# Patient Record
Sex: FEMALE | Race: WHITE | NOT HISPANIC OR LATINO | URBAN - METROPOLITAN AREA
[De-identification: names, ages, dates, MRNs, and addresses within clinical notes are randomized per-mention and may not be internally consistent; named-entity substitution may affect disease eponyms.]

---

## 2017-08-03 ENCOUNTER — AMBULATORY - HEALTHEAST (OUTPATIENT)
Dept: ADMINISTRATIVE | Facility: REHABILITATION | Age: 57
End: 2017-08-03

## 2017-08-03 DIAGNOSIS — M54.9 SPINAL PAIN: ICD-10-CM

## 2017-08-03 DIAGNOSIS — M79.18 MYOFASCIAL PAIN: ICD-10-CM

## 2017-08-03 DIAGNOSIS — R51.9 HEADACHE: ICD-10-CM

## 2017-08-03 DIAGNOSIS — S03.00XA TMJ (DISLOCATION OF TEMPOROMANDIBULAR JOINT): ICD-10-CM

## 2017-09-06 ENCOUNTER — OFFICE VISIT - HEALTHEAST (OUTPATIENT)
Dept: PHYSICAL THERAPY | Facility: REHABILITATION | Age: 57
End: 2017-09-06

## 2017-09-06 ENCOUNTER — COMMUNICATION - HEALTHEAST (OUTPATIENT)
Dept: TELEHEALTH | Facility: CLINIC | Age: 57
End: 2017-09-06

## 2017-09-06 DIAGNOSIS — M54.2 NECK PAIN, CHRONIC: ICD-10-CM

## 2017-09-06 DIAGNOSIS — M26.622 ARTHRALGIA OF LEFT TEMPOROMANDIBULAR JOINT: ICD-10-CM

## 2017-09-06 DIAGNOSIS — G89.29 NECK PAIN, CHRONIC: ICD-10-CM

## 2017-09-13 ENCOUNTER — OFFICE VISIT - HEALTHEAST (OUTPATIENT)
Dept: PHYSICAL THERAPY | Facility: REHABILITATION | Age: 57
End: 2017-09-13

## 2017-09-13 DIAGNOSIS — G89.29 NECK PAIN, CHRONIC: ICD-10-CM

## 2017-09-13 DIAGNOSIS — M26.622 ARTHRALGIA OF LEFT TEMPOROMANDIBULAR JOINT: ICD-10-CM

## 2017-09-13 DIAGNOSIS — M54.2 NECK PAIN, CHRONIC: ICD-10-CM

## 2017-09-20 ENCOUNTER — OFFICE VISIT - HEALTHEAST (OUTPATIENT)
Dept: PHYSICAL THERAPY | Facility: REHABILITATION | Age: 57
End: 2017-09-20

## 2017-09-20 DIAGNOSIS — M26.622 ARTHRALGIA OF LEFT TEMPOROMANDIBULAR JOINT: ICD-10-CM

## 2017-09-20 DIAGNOSIS — G89.29 NECK PAIN, CHRONIC: ICD-10-CM

## 2017-09-20 DIAGNOSIS — M54.2 NECK PAIN, CHRONIC: ICD-10-CM

## 2017-09-25 ENCOUNTER — OFFICE VISIT - HEALTHEAST (OUTPATIENT)
Dept: PHYSICAL THERAPY | Facility: REHABILITATION | Age: 57
End: 2017-09-25

## 2017-09-25 DIAGNOSIS — M26.622 ARTHRALGIA OF LEFT TEMPOROMANDIBULAR JOINT: ICD-10-CM

## 2017-09-25 DIAGNOSIS — M54.2 NECK PAIN, CHRONIC: ICD-10-CM

## 2017-09-25 DIAGNOSIS — G89.29 NECK PAIN, CHRONIC: ICD-10-CM

## 2017-10-02 ENCOUNTER — OFFICE VISIT - HEALTHEAST (OUTPATIENT)
Dept: PHYSICAL THERAPY | Facility: REHABILITATION | Age: 57
End: 2017-10-02

## 2017-10-02 DIAGNOSIS — M54.2 NECK PAIN, CHRONIC: ICD-10-CM

## 2017-10-02 DIAGNOSIS — G89.29 NECK PAIN, CHRONIC: ICD-10-CM

## 2017-10-02 DIAGNOSIS — M26.622 ARTHRALGIA OF LEFT TEMPOROMANDIBULAR JOINT: ICD-10-CM

## 2017-10-09 ENCOUNTER — OFFICE VISIT - HEALTHEAST (OUTPATIENT)
Dept: PHYSICAL THERAPY | Facility: REHABILITATION | Age: 57
End: 2017-10-09

## 2017-10-09 DIAGNOSIS — G89.29 NECK PAIN, CHRONIC: ICD-10-CM

## 2017-10-09 DIAGNOSIS — M26.622 ARTHRALGIA OF LEFT TEMPOROMANDIBULAR JOINT: ICD-10-CM

## 2017-10-09 DIAGNOSIS — M54.2 NECK PAIN, CHRONIC: ICD-10-CM

## 2017-10-16 ENCOUNTER — OFFICE VISIT - HEALTHEAST (OUTPATIENT)
Dept: PHYSICAL THERAPY | Facility: REHABILITATION | Age: 57
End: 2017-10-16

## 2017-10-16 DIAGNOSIS — R51.9 HEAD ACHE: ICD-10-CM

## 2017-10-16 DIAGNOSIS — M54.2 NECK PAIN, CHRONIC: ICD-10-CM

## 2017-10-16 DIAGNOSIS — G89.29 NECK PAIN, CHRONIC: ICD-10-CM

## 2017-10-16 DIAGNOSIS — M26.622 ARTHRALGIA OF LEFT TEMPOROMANDIBULAR JOINT: ICD-10-CM

## 2017-10-16 DIAGNOSIS — G44.221 CHRONIC TENSION-TYPE HEADACHE, INTRACTABLE: ICD-10-CM

## 2017-10-23 ENCOUNTER — OFFICE VISIT - HEALTHEAST (OUTPATIENT)
Dept: PHYSICAL THERAPY | Facility: REHABILITATION | Age: 57
End: 2017-10-23

## 2017-10-23 DIAGNOSIS — G44.221 CHRONIC TENSION-TYPE HEADACHE, INTRACTABLE: ICD-10-CM

## 2017-10-23 DIAGNOSIS — G89.29 NECK PAIN, CHRONIC: ICD-10-CM

## 2017-10-23 DIAGNOSIS — M54.2 NECK PAIN, CHRONIC: ICD-10-CM

## 2017-10-23 DIAGNOSIS — M26.622 ARTHRALGIA OF LEFT TEMPOROMANDIBULAR JOINT: ICD-10-CM

## 2021-05-31 ENCOUNTER — RECORDS - HEALTHEAST (OUTPATIENT)
Dept: ADMINISTRATIVE | Facility: CLINIC | Age: 61
End: 2021-05-31

## 2021-06-12 NOTE — PROGRESS NOTES
[facility] Daily Progress     Patient Name: Antonietta Womack  Date: 2017  Visit #: 2  PTA visit #:   Referral Diagnosis: headsche, myofascial pain, TMJ pain  Referring provider: Skip Youngblood MD  Visit Diagnosis:     ICD-10-CM    1. Arthralgia of left temporomandibular joint M26.622    2. Neck pain, chronic M54.2     G89.29          Assessment:     HEP/POC compliance is  good .  Patient demonstrates understanding/independence with home program.  Patient is benefitting from skilled physical therapy and is making steady progress toward functional goals.  Patient is appropriate to continue with skilled physical therapy intervention, as indicated by initial plan of care.    Goal Status:  Pt. will demonstrate/verbalize independence in self-management of condition in : 4 weeks  Pt will: be able to sleep on her left side without increased pain in 4 weeks.  Pt will: be able to eat chewy foods without increased pain in 4 weeks.      Continued ringing in the ear but less mucus production.    Plan / Patient Education:     Continue with initial plan of care.  Progress with home program as tolerated.   Patient to continue with a craniosacral therapist.    Subjective:     Pain Ratin  Started jogging and walking this week.  Ringing in the ear is still present.    Felt less congested after the last visit. Feels she had less mucus production this past week.      Objective:     Exercises:  Exercise #1: scapular retraction 5 second hold 5 times every hour  Exercise #2: diaphragmatic breathing. 5 times while keeping tongue on the roof of the mouth.  Exercise #3: laterortusion stabilization 5 second hold 5 times 2 times a day both directions.      Treatment Today     TREATMENT MINUTES COMMENTS   Evaluation     Self-care/ Home management     Manual therapy 25 Lymphatic drainage massage to the cervical spine, suboccipital release, myofascial release to the left TMJ   Neuromuscular Re-education     Therapeutic Activity      Therapeutic Exercises 5 Reviewed home exercise program   Gait training     Modality__________________                Total 30    Blank areas are intentional and mean the treatment did not include these items.       Isabel Toribio, PT  9/13/2017

## 2021-06-12 NOTE — PROGRESS NOTES
[facility]   TMJ Initial Evaluation    Patient Name: Antonietta Womack  Date of evaluation: 9/6/2017  Referral Diagnosis: Spinal pain, HA myofascial TMJ pain  Referring provider: Skip Youngblood MD  Visit Diagnosis:     ICD-10-CM    1. Arthralgia of left temporomandibular joint M26.622    2. Neck pain, chronic M54.2     G89.29        Assessment:         Patient with chronic left TMJ and bilateral neck pain.  Tends to clench her teeth at night.  Has a night splint for this.  Presents with normal TMJ mobility.  Would benefit from cranialsacral therapy to address eustachian tube issues and ringing in the ear.     Pt. is appropriate for skilled PT intervention as outlined in the Plan of Care (POC).  Pt. is a good candidate for skilled PT services to improve pain levels and function.    Goals:  Pt. will demonstrate/verbalize independence in self-management of condition in : 4 weeks  Pt will: be able to sleep on her left side without increased pain in 4 weeks.  Pt will: be able to eat chewy foods without increased pain in 4 weeks.     Patient's expectations/goals are realistic.    Barriers to Learning or Achieving Goals:  No Barriers         Plan / Patient Instructions:        Plan of Care:   Communication with: Referral Source  Patient Related Instruction: Nature of Condition;Treatment plan and rationale;Body mechanics;Posture;Precautions;Next steps;Expected outcome;Self Care instruction;Basis of treatment  Times per Week: 1  Number of Weeks: 6-8  Number of Visits: 6-8  Discharge Planning: to include self management  Therapeutic Exercise: ROM;Stretching;Strengthening  Manual Therapy: soft tissue mobilization;myofascial release;joint mobilization;muscle energy;strain counterstrain    Plan for next visit:   Continue with MLD for lymphatic drainage of the neck, postural exercises.     Subjective:         History of Present Illness:    Antonietta is a 57 y.o. female who presents to therapy today with complaints of left TMJ  pain .  Reports that her jaw clicks and that she has pain with talking and excessive chewing.  Is unable to sleep on her left side.  Has had TMJ issues for many years.  Has a night splint and has had PT before which was helpful.   Date of onset/duration of symptoms is 3/2017.Has had a lot of stress since March.  Was having a lot of trouble sleeping because of post nasal drip.  Eventually developed a plugged ear and ringing in her ear.  Saw an ENT and she felt that her exposure to dust was the reason for the ear issues.   Tried musinex D, a saline spray which helped a lot.  Continues to have mucus issues, TMJ and eustachian tube issues.   She describes their previous level of function as not limited        Pain Rating:3    Functional limitations are described as occurring with:   chewing, talking and sleeping on her left side         Objective:      Note: Items left blank indicates the item was not performed or not indicated at the time of the evaluation.    TMJ Examination  1. Arthralgia of left temporomandibular joint     2. Neck pain, chronic       Precautions/Restrictions: None    Involved joint: Left  Parafunctional activities: Clenching  Head/Jaw Observation:Breathing pattern  Upper Chest Breathing    Posture Observation:      General sitting posture is  poor.  General standing posture is poor.  Cervical:  Mild forward head      Cervical ROM:    Date: 9/6/17     *Indicate scale AROM AROM AROM   Cervical Flexion Min limited with central neck pain     Cervical Extension Min limited with central neck pain      Right Left Right Left Right Left   Cervical Sidebending         Cervical Rotation Min limited with right scapular pain Moderate limited        Cervical Protraction      Cervical Retraction Min limited.     Thoracic Flexion      Thoracic Extension      Thoracic Sidebending         Thoracic Rotation             TMJ ROM:    Motion(normal values) mm Pain Comment (noise/ deviation)   Active Incisal opening (40-60  mm) 45     Right laterotrusion (7-12mm) 12 mm clicking on the left     Left laterotrusion(7-12 mm) 12mm     Protrusion (8-12mm) 7 mm        No deviation with jaw opening.    5 mm of overbite    Positive cervical distraction test.    Palpation tenderness in the following areas: Temporalis  bilateral  Upper trapezius  bilateral  Scalenes  bilateral  Suboccipitals  left  Special Tests:  Translation: Normal    Treatment Today    TREATMENT MINUTES COMMENTS   Evaluation 30    Self-care/ Home management     Manual therapy 15 MLD to cervical spine and suprascapular fossa.  Cervical manual traction.   Neuromuscular Re-education     Therapeutic Activity     Therapeutic Exercises 15 Exercises per flow sheet   Gait training     Modality__________________                Total 60    Blank areas are intentional and mean the treatment did not include these items.   PT Evaluation Code: (Please list factors)  Patient History/Comorbidities: none  Examination: tmj pain and limited cervical mobility  Clinical Presentation: stable  Clinical Decision Making: low    Patient History/  Comorbidities Examination  (body structures and functions, activity limitations, and/or participation restrictions) Clinical Presentation Clinical Decision Making (Complexity)   No documented Comorbidities or personal factors 1-2 Elements Stable and/or uncomplicated Low   1-2 documented comorbidities or personal factor 3 Elements Evolving clinical presentation with changing characteristics Moderate   3-4 documented comorbidities or personal factors 4 or more Unstable and unpredictable High                  Isabel Toribio, PT  9/6/2017  2:50 PM

## 2021-06-13 NOTE — PROGRESS NOTES
[facility] Daily Progress     Patient Name: Antonietta Womack  Date: 10/2/2017  Visit #: 5/12    Referral Diagnosis: TMJ, Neck Pain HA's  Referring provider: Karo Eugene MD  Visit Diagnosis:     ICD-10-CM    1. Arthralgia of left temporomandibular joint M26.622    2. Neck pain, chronic M54.2     G89.29          Assessment:     Patient demonstrates understanding/independence with home program.    Goal Status:  Pt. will demonstrate/verbalize independence in self-management of condition in : 4 weeks  Pt will: be able to sleep on her left side without increased pain in 4 weeks.  Pt will: be able to eat chewy foods without increased pain in 4 weeks.     Plan / Patient Education:     Continue with initial plan of care.    Subjective:     Pain Rating: 3  Pain to L TMJ and Face      Objective:     + Scan  Cranial Arterials    Treatment Today     TREATMENT MINUTES COMMENTS   Evaluation     Self-care/ Home management     Manual therapy 30 MFR with OA relase,  Crnanial releases TMJ's  Temporal, Frontal, Nasal bones  SCS to MAX-A,   CIRC-A R,       Releases achieved,   Lymphatic flow to the R Face and head normalized  Revied with Pt TMJ and Temporal bone releases   Neuromuscular Re-education     Therapeutic Activity     Therapeutic Exercises     Gait training     Modality__________________                Total 30    Blank areas are intentional and mean the treatment did not include these items.       Arthur Townsend  10/2/2017

## 2021-06-13 NOTE — PROGRESS NOTES
[facility] Daily Progress     Patient Name: Antonietta Womack  Date: 2017  Visit #:3/12    Referral Diagnosis: TMJ,  Neck Pain  Referring provider: Karo Eugene MD  Visit Diagnosis:     ICD-10-CM    1. Arthralgia of left temporomandibular joint M26.622    2. Neck pain, chronic M54.2     G89.29          Assessment:     Patient is benefitting from skilled physical therapy and is making steady progress toward functional goals.  Patient is appropriate to continue with skilled physical therapy intervention, as indicated by initial plan of care.    Goal Status:  Pt. will demonstrate/verbalize independence in self-management of condition in : 4 weeks  Pt will: be able to sleep on her left side without increased pain in 4 weeks.  Pt will: be able to eat chewy foods without increased pain in 4 weeks.     Plan / Patient Education:     Continue with initial plan of care.    Subjective:     Pain Ratin-7/10    Sleeping on my sides will increase the pain      Objective:     Decreased lymphatic flow to the neck and head,   Clavicle R 16 sec,  L 12 sec,   Neck L 10  Sec,  R 12 sec    Treatment Today     TREATMENT MINUTES COMMENTS   Evaluation     Self-care/ Home management     Manual therapy 30 MFR with OA release.   Cranial releases tp Frontal, nasal,  Temporal bones with instruction to the pt in the temporal bone release    Releases achieved.  Lymphatic flow to the head and neck normalized.  Pt yo observe for any changes in her TMJ symptoms   Neuromuscular Re-education     Therapeutic Activity     Therapeutic Exercises     Gait training     Modality__________________                Total 30    Blank areas are intentional and mean the treatment did not include these items.       Arthur Townsend  2017

## 2021-06-13 NOTE — PROGRESS NOTES
[facility] Daily Progress     [facility] Discharge Summary  Patient Name: Antonietta Womack  Date: 1/22/2018  Referral Diagnosis: [unfilled]  Referring provider: Karo Eugene MD  Visit Diagnosis:   1. Neck pain, chronic     2. Arthralgia of left temporomandibular joint     3. Chronic tension-type headache, intractable         Goals:    Pt. will demonstrate/verbalize independence in self-management of condition in : 4 weeks  Improved with Reduction of TMJ pain to the 0-2/10 level  Pt will: be able to sleep on her left side without increased pain in 4 weeks.    Improved with the reduction of TMJ pain  Pt will: be able to eat chewy foods without increased pain in 4 weeks. Improved with the reduction of TMJ pain  Pt. will demonstrate/verbalize independence in self-management of condition in : Met      Patient was seen for 8 visits from 9-6-17 to 10-23-17 with 0 missed appointments.  The patient attended therapy initially, but did not finish the therapy sessions prescribed.  Goals were not fully achieved. Explanation for goals not achieved: Pt experienced a decrease in jaw pain to 2/10 but, did not return for continuation of therapy.   Will D/C PT    Therapy will be discontinued at this time.  The patient will need a new referral to resume.    Thank you for your referral.  Arthur ARMSTRONG Cary  1/22/2018  11:17 AM  Patient Name: Antonietta Womack  Date: 1/22/2018  Visit #: 8/12    Referral Diagnosis: TMJ pain HA's  Referring provider: Karo Eugene MD  Visit Diagnosis:     ICD-10-CM    1. Neck pain, chronic M54.2     G89.29    2. Arthralgia of left temporomandibular joint M26.622    3. Chronic tension-type headache, intractable G44.221          Assessment:     Patient demonstrates understanding/independence with home program.  Patient is benefitting from skilled physical therapy and is making steady progress toward functional goals.  Patient is appropriate to continue with skilled physical  therapy intervention, as indicated by initial plan of care.    Goal Status:  Pt. will demonstrate/verbalize independence in self-management of condition in : Met  No Data Recorded    Plan / Patient Education:     Continue with initial plan of care.    Subjective:     Pain Ratin  Pain is isolate to the L Upper Jaw muscle      Objective:     Tender points to the facial muscles,  Soft  tissue    Treatment Today     TREATMENT MINUTES COMMENTS   Evaluation     Self-care/ Home management     Manual therapy 30 SCS to L Suprahyoid,   Medial Ptergyoid Bilat.   L Temporalis,   SCS to TRIG1,2-N,   FAC2,3-N    Tender points resolved.  Pt to observe facial pain with  Sleeping on the L side   Neuromuscular Re-education     Therapeutic Activity     Therapeutic Exercises     Gait training     Modality__________________                Total 30    Blank areas are intentional and mean the treatment did not include these items.       Arthur Townsend  2018

## 2021-06-13 NOTE — PROGRESS NOTES
[facility] Daily Progress     Patient Name: Antonietta Womack  Date: 2017  Visit #:    Referral Diagnosis: TMJ pain Ringing in the ears  Referring provider: Karo Eugene MD  Visit Diagnosis:     ICD-10-CM    1. Arthralgia of left temporomandibular joint M26.622    2. Neck pain, chronic M54.2     G89.29          Assessment:     Patient demonstrates understanding/independence with home program.  Patient is benefitting from skilled physical therapy and is making steady progress toward functional goals.  Patient is appropriate to continue with skilled physical therapy intervention, as indicated by initial plan of care.    Goal Status:  Pt. will demonstrate/verbalize independence in self-management of condition in : 4 weeks  Pt will: be able to sleep on her left side without increased pain in 4 weeks.  Pt will: be able to eat chewy foods without increased pain in 4 weeks.     Plan / Patient Education:     Continue with initial plan of care.    Subjective:     Pain Ratin-4/10  Pain varies  Worse with pressure on the Left   Ringing sound changes to a higher pitch.         Objective:     Temporal bone restriction.   4 sec.   Lympahtic flow to the Neck and head 4 sec    Treatment Today     TREATMENT MINUTES COMMENTS   Evaluation     Self-care/ Home management     Manual therapy 30 MFR with OA release,  Temporal and TMJ release,  SCS to TENT-N,   SUTHERLAND-N,  SB-N,   TMJ and Temporal bone releases taught to the pt    Releases achieved.  Pt Ind in the 2 releases taught to her.  No immediate change in sound or jaw pain  Pt to observe   Neuromuscular Re-education     Therapeutic Activity     Therapeutic Exercises     Gait training     Modality__________________                Total 30    Blank areas are intentional and mean the treatment did not include these items.       Arthur Townsend  2017

## 2021-06-13 NOTE — PROGRESS NOTES
[facility] Daily Progress     Patient Name: Antonietta Womack  Date: 10/9/2017  Visit #:     Referral Diagnosis: Neck and TMJ Pain  Referring provider: Karo Eugene MD  Visit Diagnosis:     ICD-10-CM    1. Arthralgia of left temporomandibular joint M26.622    2. Neck pain, chronic M54.2     G89.29          Assessment:     Patient demonstrates understanding/independence with home program.  Patient is benefitting from skilled physical therapy and is making steady progress toward functional goals.  Patient is appropriate to continue with skilled physical therapy intervention, as indicated by initial plan of care.    Goal Status:  Pt. will demonstrate/verbalize independence in self-management of condition in : 4 weeks  Pt will: be able to sleep on her left side without increased pain in 4 weeks.  Pt will: be able to eat chewy foods without increased pain in 4 weeks.     Plan / Patient Education:     Continue with initial plan of care.    Subjective:     Pain Ratin  pain to the HA and Neck      Objective:     + scan to Lig Flavum    Treatment Today     TREATMENT MINUTES COMMENTS   Evaluation     Self-care/ Home management     Manual therapy 30 SCS to LFC2, 3-MS.   SCS to DCFA-MS,   DFCP-MS   MFR with OA release    Tender points reduced.  HA decreased   Focus on Ant Long Lig next session   Neuromuscular Re-education     Therapeutic Activity     Therapeutic Exercises     Gait training     Modality__________________                Total 30    Blank areas are intentional and mean the treatment did not include these items.       Arthur Townsend  10/9/2017

## 2021-06-13 NOTE — PROGRESS NOTES
[facility] Daily Progress     Patient Name: Antonietta Wmoack  Date: 10/16/2017  Visit #: 7/10    Referral Diagnosis: HA  TMJ pain  Referring provider: Karo Eugene MD  Visit Diagnosis:     ICD-10-CM    1. Neck pain, chronic M54.2     G89.29          Assessment:     Patient demonstrates understanding/independence with home program.  Patient is benefitting from skilled physical therapy and is making steady progress toward functional goals.  Patient is appropriate to continue with skilled physical therapy intervention, as indicated by initial plan of care.    Goal Status:  Pt. will demonstrate/verbalize independence in self-management of condition in : 4 weeks  Pt will: be able to sleep on her left side without increased pain in 4 weeks.  Pt will: be able to eat chewy foods without increased pain in 4 weeks.     Plan / Patient Education:     Continue with initial plan of care.    Subjective:     Pain Ratin  Pain to L TMJ and HA started Last Night      Objective:     R SI Downslip   + Scan  CN 5,7,10    Ant Spinal art R,   Cranial restrictions at Frontal, Nasal    Treatment Today     TREATMENT MINUTES COMMENTS   Evaluation     Self-care/ Home management     Manual therapy 60 SCS to L Digastric muscle,   FAC2,3 L-N,   TYRIG1-NL,   L ABD-N,   OM-N L,   OLF-NL,   CARLOS-N R,   TRO-N R,   HYPO-N,   SUTHERLAND-N,   LFC2, C3-MS  Cranial releases to Frontal, nasal bones    Releases achieved,   SI dysf resolved  Cranial nerve tender points resolved,   HA reducing,   Cranial bone restrictions resolved   Neck tension reduced   Neuromuscular Re-education     Therapeutic Activity     Therapeutic Exercises     Gait training     Modality__________________                Total 60    Blank areas are intentional and mean the treatment did not include these items.       Arthur Townsend  10/16/2017